# Patient Record
Sex: FEMALE | Race: WHITE | NOT HISPANIC OR LATINO | Employment: UNEMPLOYED | URBAN - METROPOLITAN AREA
[De-identification: names, ages, dates, MRNs, and addresses within clinical notes are randomized per-mention and may not be internally consistent; named-entity substitution may affect disease eponyms.]

---

## 2017-01-23 ENCOUNTER — OFFICE VISIT (OUTPATIENT)
Dept: BARIATRICS/WEIGHT MGMT | Facility: CLINIC | Age: 32
End: 2017-01-23

## 2017-01-23 VITALS
SYSTOLIC BLOOD PRESSURE: 118 MMHG | RESPIRATION RATE: 18 BRPM | BODY MASS INDEX: 40.98 KG/M2 | HEART RATE: 64 BPM | TEMPERATURE: 99.2 F | OXYGEN SATURATION: 99 % | HEIGHT: 66 IN | DIASTOLIC BLOOD PRESSURE: 70 MMHG | WEIGHT: 255 LBS

## 2017-01-23 VITALS
RESPIRATION RATE: 18 BRPM | HEART RATE: 78 BPM | DIASTOLIC BLOOD PRESSURE: 72 MMHG | HEIGHT: 66 IN | WEIGHT: 275.5 LBS | BODY MASS INDEX: 44.27 KG/M2 | TEMPERATURE: 96.5 F | SYSTOLIC BLOOD PRESSURE: 104 MMHG

## 2017-01-23 DIAGNOSIS — K21.9 GASTROESOPHAGEAL REFLUX DISEASE, ESOPHAGITIS PRESENCE NOT SPECIFIED: Primary | ICD-10-CM

## 2017-01-23 DIAGNOSIS — G43.A0 CYCLICAL VOMITING WITH NAUSEA, INTRACTABILITY OF VOMITING NOT SPECIFIED: ICD-10-CM

## 2017-01-23 DIAGNOSIS — R13.14 PHARYNGOESOPHAGEAL DYSPHAGIA: ICD-10-CM

## 2017-01-23 DIAGNOSIS — R19.7 DIARRHEA, UNSPECIFIED TYPE: ICD-10-CM

## 2017-01-23 DIAGNOSIS — D50.8 OTHER IRON DEFICIENCY ANEMIA: ICD-10-CM

## 2017-01-23 DIAGNOSIS — Z98.84 STATUS POST BARIATRIC SURGERY: ICD-10-CM

## 2017-01-23 PROBLEM — R11.15 CYCLICAL VOMITING WITH NAUSEA: Status: ACTIVE | Noted: 2017-01-23

## 2017-01-23 PROBLEM — D50.9 IRON DEFICIENCY ANEMIA: Status: ACTIVE | Noted: 2017-01-23

## 2017-01-23 PROCEDURE — 99214 OFFICE O/P EST MOD 30 MIN: CPT | Performed by: PHYSICIAN ASSISTANT

## 2017-01-23 RX ORDER — LISINOPRIL AND HYDROCHLOROTHIAZIDE 20; 12.5 MG/1; MG/1
1 TABLET ORAL DAILY
COMMUNITY
End: 2017-01-23

## 2017-01-23 RX ORDER — ONDANSETRON 4 MG/1
4 TABLET, FILM COATED ORAL EVERY 6 HOURS PRN
Qty: 21 TABLET | Refills: 0 | Status: SHIPPED | OUTPATIENT
Start: 2017-01-23

## 2017-01-23 RX ORDER — RANITIDINE 150 MG/1
150 TABLET ORAL 2 TIMES DAILY
COMMUNITY

## 2017-01-23 RX ORDER — BUSPIRONE HYDROCHLORIDE 10 MG/1
10 TABLET ORAL 3 TIMES DAILY
COMMUNITY
End: 2017-01-23

## 2017-01-23 RX ORDER — OMEPRAZOLE 40 MG/1
40 CAPSULE, DELAYED RELEASE ORAL DAILY
Qty: 30 CAPSULE | Refills: 2 | Status: SHIPPED | OUTPATIENT
Start: 2017-01-23 | End: 2017-02-22

## 2017-01-23 NOTE — MR AVS SNAPSHOT
Magalie N Shekhar   1/23/2017 10:00 AM   Office Visit    Dept Phone:  [phone]   Encounter #:  [id number]    Provider:  MICHAEL Rutledge   Department:  [facility] BARIATRIC SURGERY                Your Full Care Plan              Today's Medication Changes          These changes are accurate as of: 1/23/17 11:09 AM.  If you have any questions, ask your nurse or doctor.               New Medication(s)Ordered:     omeprazole 40 MG capsule   Commonly known as:  priLOSEC   Take 1 capsule by mouth Daily for 30 days.   Started by:  MICHAEL Rutledge       ondansetron 4 MG tablet   Commonly known as:  ZOFRAN   Take 1 tablet by mouth Every 6 (Six) Hours As Needed for nausea or vomiting.   Started by:  MICHAEL Rutledge         Stop taking medication(s)listed here:     busPIRone 10 MG tablet   Commonly known as:  BUSPAR   Stopped by:  MICHAEL Rutledge           lisinopril-hydrochlorothiazide 20-12.5 MG per tablet   Commonly known as:  PRINZIDE,ZESTORETIC   Stopped by:  MICHAEL Rutledge           metFORMIN 500 MG tablet   Commonly known as:  GLUCOPHAGE   Stopped by:  MICHAEL Rutledge                Where to Get Your Medications      These medications were sent to [place], [place] - [address] [facility] - [phone]  - [phone] [address] [address]     Phone:  [phone]     omeprazole 40 MG capsule    ondansetron 4 MG tablet                  Your Updated Medication List          This list is accurate as of: 1/23/17 11:09 AM.  Always use your most recent med list.                metoprolol tartrate 25 MG tablet   Commonly known as:  LOPRESSOR       omeprazole 40 MG capsule   Commonly known as:  priLOSEC   Take 1 capsule by mouth Daily for 30 days.       ondansetron 4 MG tablet   Commonly known as:  ZOFRAN   Take 1 tablet by mouth Every 6 (Six) Hours As Needed for nausea or vomiting.       raNITIdine 150 MG  tablet   Commonly known as:  ZANTAC               We Performed the Following     Amylase     CBC (No Diff)     Clostridium Difficile Toxin, PCR     Comprehensive Metabolic Panel     Fecal Leukocytes     Ferritin     Folate     Iron     Lipase     Methylmalonic Acid, Serum     Prealbumin     Stool Culture     Vitamin B1, Whole Blood     Vitamin D 25 Hydroxy       You Were Diagnosed With        Codes Comments    Gastroesophageal reflux disease, esophagitis presence not specified    -  Primary ICD-10-CM: K21.9  ICD-9-CM: 530.81     Pharyngoesophageal dysphagia     ICD-10-CM: R13.14  ICD-9-CM: 787.24     Cyclical vomiting with nausea, intractability of vomiting not specified     ICD-10-CM: G43.A0  ICD-9-CM: 536.2     Diarrhea, unspecified type     ICD-10-CM: R19.7  ICD-9-CM: 787.91     Other iron deficiency anemia     ICD-10-CM: D50.8       Instructions     None    Patient Instructions History      Upcoming Appointments     Visit Type Date Time Department    FOLLOW UP 2017 10:00 AM [facility]ATRIC [facility]      [employer] Signup     [facility] [employer]fing allows you to send messages to your doctor, view your test results, renew your prescriptions, schedule appointments, and more. To sign up, go to [employer] and click on the Sign Up Now link in the New User? box. Enter your [employer] Activation Code exactly as it appears below along with the last four digits of your Social Security Number and your Date of Birth () to complete the sign-up process. If you do not sign up before the expiration date, you must request a new code.    Kit[employer] Activation Code: [email or web address]  Expires: 2017 11:09 AM    If you have questions, you can email [email or web address] or call [phone] to talk to our Kitchensur[employer] staff. Remember, Kitchensurfing is NOT to be used for urgent needs. For medical emergencies, dial 911.               Other Info from Your Visit           Allergies     No Known Allergies      Vital Signs      "Blood Pressure Pulse Temperature Respirations Height    118/70 (BP Location: Left arm, Patient Position: Sitting, Cuff Size: Large Adult) 64 99.2 °F (37.3 °C) (Temporal Artery ) 18 66\" (167.6 cm)    Weight Oxygen Saturation Body Mass Index Smoking Status       255 lb (116 kg) 99% 41.16 kg/m2 Never Smoker       Problems and Diagnoses Noted     Anxiety problem    Cyclical vomiting with nausea    Diarrhea    Tiredness    Acid reflux disease    Personal history of Helicobacter infection    High blood pressure    Serum albumin decreased    Iron deficiency anemia    Difficulty swallowing        "

## 2017-01-23 NOTE — PROGRESS NOTES
[facility] BARIATRIC SURGERY  [address]  [facility] [address]  [phone]    Magalie SMITHA Corrigan.  1985    Date of Visit:   1/23/17  Reason for Visit:  Reflux/dysphagia  HPI:    31 y.o. year old female 20 months s/p LSG by Dr. Mora  on 5/4/15 @ [facility] followed by lap HHR on 12/2/15 by GDW for uncontrolled reflux. Issue improved but cont. needing daily Zantac. The past 3 months, she's not been able to eat meat d/t dysphagia/reflux, tried increasing Zantac to tid and problem continued. In the past 4 weeks she's had worsening dysphagia/reflux and now also having postprandial vomiting and epigastric pain. History of Hpylori: required 2 courses anbx prior to LSG, tx w/prevpak 4/2014,  then UBT(+) and tx Helidac 9/2014. LSG path negative 5/2015. and 7/17/15 HSA neg. EGD 9/10/15 Hpylori + tx w/Flagyl and Clarithromycin w/PPI, pt never had retest for H pylori to confirm clearance.  Note: the last OV in our clinic 2/2016 @ 9 months s/p LSG. Pt had 2 years annual in [place] 5/2016..   Past Medical History   Diagnosis Date   • Anxiety    • Chronic back pain      improved after WLS   • Dyspnea on exertion    • Fatigue    • GERD (gastroesophageal reflux disease)    • History of Helicobacter pylori infection      required 2 courses anbx prior to LSG, tx w/prevpak 4/2014,  then UBT(+) and tx Helidac 9/2014. LSG path negative 5/2015. and 7/17/15 HSA neg. EGD 9/10/15 Hpylori + tx w/Flagyl and Clarithromycin w/PPI, needs retesting   • Hypertension      off Lisinopril after WLS   • Hypoalbuminemia    • Leukocytosis    • Morbid obesity      Past Surgical History   Procedure Laterality Date   • [place] tooth extraction     • Cholecystectomy     • Colonoscopy         Current Outpatient Prescriptions:   •  raNITIdine (ZANTAC) 150 MG tablet, Take 300 mg by mouth 2 (Two) Times a Day., Disp: , Rfl:   •  metoprolol tartrate (LOPRESSOR) 25 MG tablet, Take 25 mg by mouth 2 (Two) Times a Day., Disp: ,  "Rfl:   •  omeprazole (priLOSEC) 40 MG capsule, Take 1 capsule by mouth Daily for 30 days., Disp: 30 capsule, Rfl: 2  •  ondansetron (ZOFRAN) 4 MG tablet, Take 1 tablet by mouth Every 6 (Six) Hours As Needed for nausea or vomiting., Disp: 21 tablet, Rfl: 0  No Known Allergies  Social History     Social History   • Marital status:      Spouse name: N/A   • Number of children: N/A   • Years of education: N/A     Occupational History   • Not on file.     Social History Main Topics   • Smoking status: Never Smoker   • Smokeless tobacco: Not on file   • Alcohol use No   • Drug use: No   • Sexual activity: Not on file      Comment:      Other Topics Concern   • Not on file     Social History Narrative    Lives in [place] [facility] w/4 children.     Working part-time [employer]     Review of Systems   General ROS: positive for - fatigue  Ophthalmic ROS: Negative for - vision changes  ENT ROS: Negative for - swallowing difficulty  Gastrointestinal ROS: Negative for - abdominal pain, constipation,   Positive diarrhea(1 week, 3x/day-now improving)  Neurological ROS: Negative for - numbness/tingling of extremities, memory loss  Positive for dizziness  Dermatological ROS: Negative for - Hair loss         Visit Vitals   • /70 (BP Location: Left arm, Patient Position: Sitting, Cuff Size: Large Adult)   • Pulse 64   • Temp 99.2 °F (37.3 °C) (Temporal Artery )   • Resp 18   • Ht 66\" (167.6 cm)   • Wt 255 lb (116 kg)  Comment: pre-op weight 462.2 lbs   • SpO2 99%   • BMI 41.16 kg/m2     Physical Exam:  Constitutional: She is oriented to person, place, and time. She appears well-developed and well-nourished.   Oral Cavity:  Buccal Mucosa: The buccal mucosa was moist.  Neck: supple.   Cardiovascular: Normal rate and regular rhythm.   No murmur heard.  Pulmonary/Chest: Breath sounds normal. No respiratory distress. She has no wheezes. She has no rales.   Abdominal: No mass was palpated in the abdomen, Soft, " nontender, and nondistended. Bowel sounds are normal.   Skin:  The complexion was normal.  The skin moisture was normal and the skin temperature was normal.   Neurological:  Was alert and oriented.   Gait And Stance: Gait and stance were normal.  Psychiatric:   Attitude: The attitude was cooperative.   Mood: Mood pleasant.       Assessment:     ICD-10-CM ICD-9-CM   1. Gastroesophageal reflux disease, esophagitis presence not specified K21.9 530.81   2. Pharyngoesophageal dysphagia R13.14 787.24   3. Cyclical vomiting with nausea, intractability of vomiting not specified G43.A0 536.2   4. Diarrhea, unspecified type R19.7 787.91   5. Other iron deficiency anemia D50.8      Plan:     Orders Placed This Encounter   Procedures   • Clostridium Difficile Toxin, PCR   • Fecal Leukocytes   • Stool Culture   • Comprehensive Metabolic Panel   • CBC (No Diff)   • Ferritin   • Folate   • Iron   • Methylmalonic Acid, Serum   • Prealbumin   • Vitamin B1, Whole Blood   • Vitamin D 25 Hydroxy   • Amylase   • Lipase     Requested Prescriptions     Signed Prescriptions Disp Refills   • omeprazole (priLOSEC) 40 MG capsule 30 capsule 2     Sig: Take 1 capsule by mouth Daily for 30 days.   • ondansetron (ZOFRAN) 4 MG tablet 21 tablet 0     Sig: Take 1 tablet by mouth Every 6 (Six) Hours As Needed for nausea or vomiting.     Order UGI   Further input pending results.    MICHAEL Rutledge

## 2017-01-25 LAB
25(OH)D3+25(OH)D2 SERPL-MCNC: 18.4 NG/ML
ALBUMIN SERPL-MCNC: 3.8 G/DL (ref 3.2–4.8)
ALBUMIN/GLOB SERPL: 1.7 G/DL (ref 1.5–2.5)
ALP SERPL-CCNC: 56 U/L (ref 25–100)
ALT SERPL-CCNC: 28 U/L (ref 7–40)
AST SERPL-CCNC: 28 U/L (ref 0–33)
BILIRUB SERPL-MCNC: 0.7 MG/DL (ref 0.3–1.2)
BUN SERPL-MCNC: 14 MG/DL (ref 9–23)
BUN/CREAT SERPL: 28 (ref 7–25)
CALCIUM SERPL-MCNC: 9.1 MG/DL (ref 8.7–10.4)
CHLORIDE SERPL-SCNC: 108 MMOL/L (ref 99–109)
CO2 SERPL-SCNC: 31 MMOL/L (ref 20–31)
CREAT SERPL-MCNC: 0.5 MG/DL (ref 0.6–1.3)
ERYTHROCYTE [DISTWIDTH] IN BLOOD BY AUTOMATED COUNT: 14.9 % (ref 11.3–14.5)
FERRITIN SERPL-MCNC: 13 NG/ML (ref 10–291)
FOLATE SERPL-MCNC: 4.89 NG/ML (ref 3.2–20)
GLOBULIN SER CALC-MCNC: 2.3 GM/DL
GLUCOSE SERPL-MCNC: 84 MG/DL (ref 70–100)
HCT VFR BLD AUTO: 36.2 % (ref 34.5–44)
HGB BLD-MCNC: 11.4 G/DL (ref 11.5–15.5)
IRON SERPL-MCNC: 43 MCG/DL (ref 50–175)
MCH RBC QN AUTO: 28.9 PG (ref 27–31)
MCHC RBC AUTO-ENTMCNC: 31.5 G/DL (ref 32–36)
MCV RBC AUTO: 91.9 FL (ref 80–99)
METHYLMALONATE SERPL-SCNC: 162 NMOL/L (ref 0–378)
PLATELET # BLD AUTO: 223 10*3/MM3 (ref 150–450)
POTASSIUM SERPL-SCNC: 4.7 MMOL/L (ref 3.5–5.5)
PREALB SERPL-MCNC: 20 MG/DL (ref 14–35)
PROT SERPL-MCNC: 6.1 G/DL (ref 5.7–8.2)
RBC # BLD AUTO: 3.94 10*6/MM3 (ref 3.89–5.14)
SODIUM SERPL-SCNC: 142 MMOL/L (ref 132–146)
VIT B1 BLD-SCNC: 140.9 NMOL/L (ref 66.5–200)
WBC # BLD AUTO: 5.42 10*3/MM3 (ref 3.5–10.8)

## 2017-01-27 PROBLEM — Z98.84 STATUS POST BARIATRIC SURGERY: Status: ACTIVE | Noted: 2017-01-27

## 2017-02-01 ENCOUNTER — TELEPHONE (OUTPATIENT)
Dept: BARIATRICS/WEIGHT MGMT | Facility: CLINIC | Age: 32
End: 2017-02-01

## 2017-02-01 NOTE — TELEPHONE ENCOUNTER
I contacted the patient and she states that she has the lab order for this and will take it to a local lab and have this completed. I informed patient to let us know where and when she has this done so we can get the results.

## 2017-02-01 NOTE — TELEPHONE ENCOUNTER
----- Message from MICHAEL Rutledge sent at 1/27/2017 10:13 PM EST -----  Please ask pt to return for Hpylori stool antigen.  thx

## 2017-02-06 ENCOUNTER — HOSPITAL ENCOUNTER (OUTPATIENT)
Dept: GENERAL RADIOLOGY | Facility: HOSPITAL | Age: 32
Discharge: HOME | End: 2017-02-06
Admitting: PHYSICIAN ASSISTANT

## 2017-02-06 PROCEDURE — 74241: CPT

## 2017-02-06 RX ORDER — ERGOCALCIFEROL 1.25 MG/1
50000 CAPSULE ORAL WEEKLY
Qty: 12 CAPSULE | Refills: 0 | Status: SHIPPED | OUTPATIENT
Start: 2017-02-06

## 2017-02-17 ENCOUNTER — TELEPHONE (OUTPATIENT)
Dept: BARIATRICS/WEIGHT MGMT | Facility: CLINIC | Age: 32
End: 2017-02-17

## 2017-02-17 NOTE — TELEPHONE ENCOUNTER
Notified pt of her results of her UGI.  Pt said that she has not had the HPylori test done yet.  I notified pt that she needs to have this done when she can and please send us the results or notify us when she does so we can get the results.  Pt verbalized understanding.

## 2017-02-24 LAB
C DIFF TOX A+B STL QL IA: NEGATIVE
Lab: NORMAL

## 2017-02-24 NOTE — TELEPHONE ENCOUNTER
Pt had test done at [facility] in [facility]. Rocio is helping me get results.  Thanks

## 2017-02-26 LAB
BACTERIA SPEC CULT: NORMAL
BACTERIA SPEC CULT: NORMAL
CAMPYLOBACTER STL CULT: NORMAL
E COLI SXT STL QL IA: NEGATIVE
Lab: NORMAL
SALM + SHIG STL CULT: NORMAL
WBC STL QL MICRO: NORMAL
WBC STL QL MICRO: NORMAL

## 2017-02-28 ENCOUNTER — TELEPHONE (OUTPATIENT)
Dept: BARIATRICS/WEIGHT MGMT | Facility: CLINIC | Age: 32
End: 2017-02-28

## 2017-02-28 NOTE — TELEPHONE ENCOUNTER
I have tried contacting this pt with no answer.  I have talked to this pt several times with a run around of her HPylori test being with [facility] and we contacted them and it was not done there.  Then the pt said [facility] and we called there and it was not there either.  Please advise me on what to do.  Thanks!

## 2017-12-18 ENCOUNTER — OFFICE VISIT (OUTPATIENT)
Dept: BARIATRICS/WEIGHT MGMT | Facility: CLINIC | Age: 32
End: 2017-12-18

## 2017-12-18 VITALS
OXYGEN SATURATION: 99 % | WEIGHT: 290 LBS | HEART RATE: 82 BPM | RESPIRATION RATE: 18 BRPM | SYSTOLIC BLOOD PRESSURE: 110 MMHG | DIASTOLIC BLOOD PRESSURE: 78 MMHG | HEIGHT: 66 IN | BODY MASS INDEX: 46.61 KG/M2 | TEMPERATURE: 97.8 F

## 2017-12-18 DIAGNOSIS — E55.9 VITAMIN D DEFICIENCY: ICD-10-CM

## 2017-12-18 DIAGNOSIS — E66.01 MORBID OBESITY (HCC): ICD-10-CM

## 2017-12-18 DIAGNOSIS — K21.9 GASTROESOPHAGEAL REFLUX DISEASE, ESOPHAGITIS PRESENCE NOT SPECIFIED: ICD-10-CM

## 2017-12-18 DIAGNOSIS — R10.13 DYSPEPSIA: Primary | ICD-10-CM

## 2017-12-18 DIAGNOSIS — Z98.84 S/P BARIATRIC SURGERY: ICD-10-CM

## 2017-12-18 DIAGNOSIS — E61.1 IRON DEFICIENCY: ICD-10-CM

## 2017-12-18 PROBLEM — R11.15 CYCLICAL VOMITING WITH NAUSEA: Status: RESOLVED | Noted: 2017-01-23 | Resolved: 2017-12-18

## 2017-12-18 PROBLEM — R13.14 PHARYNGOESOPHAGEAL DYSPHAGIA: Status: RESOLVED | Noted: 2017-01-23 | Resolved: 2017-12-18

## 2017-12-18 PROBLEM — R19.7 DIARRHEA: Status: RESOLVED | Noted: 2017-01-23 | Resolved: 2017-12-18

## 2017-12-18 PROCEDURE — 99214 OFFICE O/P EST MOD 30 MIN: CPT | Performed by: PHYSICIAN ASSISTANT

## 2017-12-18 RX ORDER — OMEPRAZOLE 40 MG/1
40 CAPSULE, DELAYED RELEASE ORAL 2 TIMES DAILY
COMMUNITY

## 2017-12-18 RX ORDER — GABAPENTIN 600 MG/1
TABLET ORAL
COMMUNITY
Start: 2017-12-04

## 2017-12-18 RX ORDER — BUPRENORPHINE HYDROCHLORIDE AND NALOXONE HYDROCHLORIDE DIHYDRATE 8; 2 MG/1; MG/1
TABLET SUBLINGUAL
COMMUNITY
Start: 2017-12-11

## 2017-12-18 RX ORDER — HYDROXYZINE HYDROCHLORIDE 10 MG/1
TABLET, FILM COATED ORAL
COMMUNITY
Start: 2017-12-08

## 2017-12-18 RX ORDER — METHOCARBAMOL 750 MG/1
TABLET, FILM COATED ORAL
COMMUNITY
Start: 2017-11-14

## 2017-12-18 RX ORDER — IBUPROFEN 800 MG/1
TABLET ORAL
COMMUNITY
Start: 2017-11-06

## 2017-12-18 NOTE — PROGRESS NOTES
"[facility] Bariatric Surgery  [address]  [facility] [address] [phone]        Patient Name:  Magalie Corrigan.  :  1985      Date of Visit: 2017      Reason for Visit:   reflux    HPI: Magalie Corrigan is a 32 y.o. female 2+ yrs s/p LS[facility] on [facility], followed by lap HHR 2015 by [facility]DW.    LOV 2017 - c/o uncontrolled reflux w/ dysphagia.  Eval included:  UGI 17 @[facility] revealing possible paraesophageal hernia vs hiatal hernia.  Patient has not followed up since that time.    Returns now w/ complaints of persistent/worsening symptoms.  Having uncontrolled acid reflux, worse in the PM, wakes choking on acid, despite taking BID PPI + BID H2 blocker.  Has associated nausea and takes Zofran prn which helps only \"sometimes\" - \"depends on how bad the acid reflux is\".  Also c/o postprandial epigastric pain that wraps around left side into back, worse w/ higher protein/solid foods.  As such making poor food choices and notes a 30-40 lbs weight gain in the last 1 year.      Hx o/w significant for remote cherelle .  Issues w/ drug abuse/addiction s/p LSG.  Now on Suboxone and sober x 8 months.  Takes Ibuprofen 800mg prn pain.  Has had recent steroids.  Denies tobacco.   Hx persistent H.Pylori - awaiting f/up stool study results.  As above, s/p lap R 2015.    Taking a MVI, B12, and iron daily, along w/ Vit D wkly.  Last labs 2017 revealed iron and Vit D deficiencies.     Presurgery weight: 426 pounds.  Today's weight is 132 kg (290 lb) pounds, today's  Body mass index is 46.81 kg/(m^2)., and her weight loss since surgery is 136 pounds.      Past Medical History:   Diagnosis Date   • Anxiety    • Chronic back pain    • Drug abuse     on Suboxone   • Dyspnea on exertion    • Fatigue    • GERD (gastroesophageal reflux disease)    • History of Helicobacter pylori infection     required 2 courses abx prior to LSG - tx w/prevpak 2014,  then UBT(+) and tx " Helidac 9/2014. LSG path negative 5/2015. and 7/17/15 HSA neg. EGD 9/10/15 Hpylori + tx w/Flagyl and Clarithromycin w/PPI, needs retesting   • Hypertension     off meds s/p WLS   • Hypoalbuminemia    • Leukocytosis    • Morbid obesity      Past Surgical History:   Procedure Laterality Date   • CHOLECYSTECTOMY  2014    for stones   • GASTRIC SLEEVE LAPAROSCOPIC  05/2015    s/p LSG by KARENA @ [facility]   • HIATAL HERNIA REPAIR  12/2015    s/p lap HHR by GDW   • WISDOM TOOTH EXTRACTION       Outpatient Prescriptions Marked as Taking for the 12/18/17 encounter (Office Visit) with MICHAEL Perez   Medication Sig Dispense Refill   • buprenorphine-naloxone (SUBOXONE) 8-2 MG per SL tablet      • gabapentin (NEURONTIN) 600 MG tablet      • hydrOXYzine (ATARAX) 10 MG tablet      • ibuprofen (ADVIL,MOTRIN) 800 MG tablet      • methocarbamol (ROBAXIN) 750 MG tablet      • omeprazole (priLOSEC) 40 MG capsule Take 40 mg by mouth 2 (Two) Times a Day.     • ondansetron (ZOFRAN) 4 MG tablet Take 1 tablet by mouth Every 6 (Six) Hours As Needed for nausea or vomiting. 21 tablet 0   • raNITIdine (ZANTAC) 150 MG tablet Take 150 mg by mouth 2 (Two) Times a Day.     • vitamin D (ERGOCALCIFEROL) 89370 UNITS capsule capsule Take 1 capsule by mouth 1 (One) Time Per Week. 12 capsule 0   • [DISCONTINUED] metoprolol tartrate (LOPRESSOR) 25 MG tablet Take 25 mg by mouth 2 (Two) Times a Day.         No Known Allergies    Social History     Social History   • Marital status:      Spouse name: N/A   • Number of children: N/A   • Years of education: N/A     Occupational History   • Not on file.     Social History Main Topics   • Smoking status: Never Smoker   • Smokeless tobacco: Not on file   • Alcohol use No   • Drug use: No   • Sexual activity: Not on file      Comment:      Other Topics Concern   • Not on file     Social History Narrative    Lives in [facility] w/4 children.     Working part-time [employer]       /78  "(BP Location: Left arm, Patient Position: Sitting, Cuff Size: Large Adult)  Pulse 82  Temp 97.8 °F (36.6 °C) (Temporal Artery )   Resp 18  Ht 167.6 cm (66\")  Wt 132 kg (290 lb)  SpO2 99%  BMI 46.81 kg/m2    Physical Exam   Constitutional: She appears well-developed and well-nourished. She is cooperative.   HENT:   Mouth/Throat: Oropharynx is clear and moist and mucous membranes are normal.   Eyes: Conjunctivae are normal. No scleral icterus.   Cardiovascular: Normal rate.    Pulmonary/Chest: Effort normal.   Abdominal: Soft. There is no tenderness.   Musculoskeletal: Normal range of motion. She exhibits no edema.   Neurological: She is alert.   Skin: Skin is warm and dry. No rash noted.   Psychiatric: She has a normal mood and affect. Judgment normal.         Assessment:  2+ yrs s/p LSG by KARENA on 5/4/2015 @[facility]    ICD-10-CM ICD-9-CM   1. Dyspepsia R10.13 536.8   2. Gastroesophageal reflux disease, esophagitis presence not specified K21.9 530.81   3. Vitamin D deficiency E55.9 268.9   4. Iron deficiency E61.1 280.9   5. Morbid obesity E66.01 278.01   6. S/P bariatric surgery Z98.84 V45.86       Plan:  Full bariatric panel ordered.  Will f/up on H.Pylori testing as well.  Will then discuss further w/ Dr. Mora - may need repeat UGI vs EGD at this time.  Continue current antacid regimen.  Further input to follow.              "

## 2017-12-19 DIAGNOSIS — R10.13 DYSPEPSIA: Primary | ICD-10-CM

## 2017-12-23 LAB
25(OH)D3+25(OH)D2 SERPL-MCNC: 31.4 NG/ML (ref 30–100)
A-TOCOPHEROL VIT E SERPL-MCNC: 9.5 MG/L (ref 5.3–16.8)
ALBUMIN SERPL-MCNC: 3.7 G/DL (ref 3.5–5.5)
ALBUMIN/GLOB SERPL: 1.5 {RATIO} (ref 1.2–2.2)
ALP SERPL-CCNC: 69 IU/L (ref 39–117)
ALT SERPL-CCNC: 15 IU/L (ref 0–32)
AST SERPL-CCNC: 25 IU/L (ref 0–40)
BASOPHILS # BLD AUTO: 0 X10E3/UL (ref 0–0.2)
BASOPHILS NFR BLD AUTO: 1 %
BILIRUB SERPL-MCNC: 0.3 MG/DL (ref 0–1.2)
BUN SERPL-MCNC: 9 MG/DL (ref 6–20)
BUN/CREAT SERPL: 13 (ref 9–23)
CALCIUM SERPL-MCNC: 8.7 MG/DL (ref 8.7–10.2)
CHLORIDE SERPL-SCNC: 103 MMOL/L (ref 96–106)
CO2 SERPL-SCNC: 25 MMOL/L (ref 18–29)
CREAT SERPL-MCNC: 0.71 MG/DL (ref 0.57–1)
EOSINOPHIL # BLD AUTO: 0.1 X10E3/UL (ref 0–0.4)
EOSINOPHIL NFR BLD AUTO: 2 %
ERYTHROCYTE [DISTWIDTH] IN BLOOD BY AUTOMATED COUNT: 15.7 % (ref 12.3–15.4)
FERRITIN SERPL-MCNC: 10 NG/ML (ref 15–150)
FOLATE SERPL-MCNC: 6.2 NG/ML
GLOBULIN SER CALC-MCNC: 2.5 G/DL (ref 1.5–4.5)
GLUCOSE SERPL-MCNC: 84 MG/DL (ref 65–99)
HCT VFR BLD AUTO: 35 % (ref 34–46.6)
HGB BLD-MCNC: 10.8 G/DL (ref 11.1–15.9)
IMM GRANULOCYTES # BLD: 0 X10E3/UL (ref 0–0.1)
IMM GRANULOCYTES NFR BLD: 0 %
IRON SERPL-MCNC: 57 UG/DL (ref 27–159)
LYMPHOCYTES # BLD AUTO: 2.1 X10E3/UL (ref 0.7–3.1)
LYMPHOCYTES NFR BLD AUTO: 35 %
MAGNESIUM SERPL-MCNC: 1.9 MG/DL (ref 1.6–2.3)
MCH RBC QN AUTO: 26.3 PG (ref 26.6–33)
MCHC RBC AUTO-ENTMCNC: 30.9 G/DL (ref 31.5–35.7)
MCV RBC AUTO: 85 FL (ref 79–97)
METHYLMALONATE SERPL-SCNC: 106 NMOL/L (ref 0–378)
MONOCYTES # BLD AUTO: 0.4 X10E3/UL (ref 0.1–0.9)
MONOCYTES NFR BLD AUTO: 7 %
NEUTROPHILS # BLD AUTO: 3.2 X10E3/UL (ref 1.4–7)
NEUTROPHILS NFR BLD AUTO: 55 %
PHOSPHATE SERPL-MCNC: 3.7 MG/DL (ref 2.5–4.5)
PLATELET # BLD AUTO: 316 X10E3/UL (ref 150–379)
POTASSIUM SERPL-SCNC: 4.8 MMOL/L (ref 3.5–5.2)
PREALB SERPL-MCNC: 19 MG/DL (ref 14–35)
PROT SERPL-MCNC: 6.2 G/DL (ref 6–8.5)
PTH-INTACT SERPL-MCNC: 26 PG/ML (ref 15–65)
RBC # BLD AUTO: 4.11 X10E6/UL (ref 3.77–5.28)
SODIUM SERPL-SCNC: 141 MMOL/L (ref 134–144)
VIT A SERPL-MCNC: 42 UG/DL (ref 20–65)
VIT B1 BLD-SCNC: 137.7 NMOL/L (ref 66.5–200)
WBC # BLD AUTO: 5.9 X10E3/UL (ref 3.4–10.8)
ZINC SERPL-MCNC: 63 UG/DL (ref 56–134)

## 2018-01-03 ENCOUNTER — OFFICE VISIT (OUTPATIENT)
Dept: NEUROSURGERY | Facility: CLINIC | Age: 33
End: 2018-01-03

## 2018-01-03 VITALS — HEIGHT: 66 IN | BODY MASS INDEX: 46.12 KG/M2 | TEMPERATURE: 96.2 F | WEIGHT: 287 LBS

## 2018-01-03 DIAGNOSIS — M50.30 DDD (DEGENERATIVE DISC DISEASE), CERVICAL: ICD-10-CM

## 2018-01-03 DIAGNOSIS — M47.812 SPONDYLOSIS OF CERVICAL REGION WITHOUT MYELOPATHY OR RADICULOPATHY: ICD-10-CM

## 2018-01-03 DIAGNOSIS — S13.9XXA NECK SPRAIN, INITIAL ENCOUNTER: Primary | ICD-10-CM

## 2018-01-03 PROCEDURE — 99243 OFF/OP CNSLTJ NEW/EST LOW 30: CPT | Performed by: NEUROLOGICAL SURGERY
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
## 2018-03-19 ENCOUNTER — RESULTS ENCOUNTER (OUTPATIENT)
Dept: BARIATRICS/WEIGHT MGMT | Facility: CLINIC | Age: 33
End: 2018-03-19

## 2018-03-19 DIAGNOSIS — R10.13 DYSPEPSIA: ICD-10-CM

## 2022-01-01 ENCOUNTER — HOSPITAL ENCOUNTER (EMERGENCY)
Facility: HOSPITAL | Age: 37
End: 2022-11-09
Attending: EMERGENCY MEDICINE | Admitting: EMERGENCY MEDICINE

## 2022-01-01 DIAGNOSIS — I46.9 CARDIAC ARREST: Primary | ICD-10-CM

## 2022-01-01 PROCEDURE — 25010000002 TENECTEPLASE PER 50 MG: Performed by: EMERGENCY MEDICINE

## 2022-01-01 PROCEDURE — C1751 CATH, INF, PER/CENT/MIDLINE: HCPCS

## 2022-01-01 PROCEDURE — 92950 HEART/LUNG RESUSCITATION CPR: CPT

## 2022-01-01 PROCEDURE — 99284 EMERGENCY DEPT VISIT MOD MDM: CPT

## 2022-01-01 PROCEDURE — 25010000002 EPINEPHRINE 1 MG/10ML SOLUTION PREFILLED SYRINGE: Performed by: EMERGENCY MEDICINE

## 2022-01-01 PROCEDURE — 94799 UNLISTED PULMONARY SVC/PX: CPT

## 2022-01-01 RX ORDER — EPINEPHRINE 0.1 MG/ML
SYRINGE (ML) INJECTION
Status: DISCONTINUED | OUTPATIENT
Start: 2022-01-01 | End: 2022-01-01 | Stop reason: HOSPADM

## 2022-01-01 RX ADMIN — SODIUM BICARBONATE 50 MEQ: 84 INJECTION, SOLUTION INTRAVENOUS at 17:25

## 2022-01-01 RX ADMIN — EPINEPHRINE 1 MG: 0.1 INJECTION, SOLUTION ENDOTRACHEAL; INTRACARDIAC; INTRAVENOUS at 17:21

## 2022-01-01 RX ADMIN — EPINEPHRINE 1 MG: 0.1 INJECTION, SOLUTION ENDOTRACHEAL; INTRACARDIAC; INTRAVENOUS at 17:24

## 2022-01-01 RX ADMIN — SODIUM BICARBONATE 50 MEQ: 84 INJECTION, SOLUTION INTRAVENOUS at 17:37

## 2022-01-01 RX ADMIN — TENECTEPLASE 25 MG: KIT at 17:27

## 2022-01-01 RX ADMIN — EPINEPHRINE 1 MG: 0.1 INJECTION, SOLUTION ENDOTRACHEAL; INTRACARDIAC; INTRAVENOUS at 17:42

## 2022-01-01 RX ADMIN — EPINEPHRINE 1 MG: 0.1 INJECTION, SOLUTION ENDOTRACHEAL; INTRACARDIAC; INTRAVENOUS at 17:46

## 2022-01-01 RX ADMIN — EPINEPHRINE 1 MG: 0.1 INJECTION, SOLUTION ENDOTRACHEAL; INTRACARDIAC; INTRAVENOUS at 17:38

## 2022-01-01 RX ADMIN — EPINEPHRINE 1 MG: 0.1 INJECTION, SOLUTION ENDOTRACHEAL; INTRACARDIAC; INTRAVENOUS at 17:28

## 2022-01-01 RX ADMIN — EPINEPHRINE 1 MG: 0.1 INJECTION, SOLUTION ENDOTRACHEAL; INTRACARDIAC; INTRAVENOUS at 17:33

## 2022-11-09 NOTE — ED PROVIDER NOTES
Subjective   History of Present Illness  37-year-old female presents to the ED via EMS for chief complaint of cardiac arrest.  Per EMS they were called secondary to the patient having worsening shortness of breath.  The patient initially told that she had been short of breath for 3 days.  On EMSs initial arrival the patient's pulse ox was 90.  Apparently she looked at EMS and said that she could not breathe and she was going to die.  The patient then had cardiac arrest.  She stopped breathing and went into pulseless electrical activity.  The patient was subsequently intubated and CPR was initiated per EMS.  They continued CPR per ACLS protocol in route to the hospital.  On arrival to the ED patient was still in asystole with CPR in progress.        Review of Systems   Unable to perform ROS: Intubated (CPR in progress)       Past Medical History:   Diagnosis Date   • Anxiety    • Chronic back pain    • Drug abuse     on Suboxone   • Dyspnea on exertion    • Fatigue    • GERD (gastroesophageal reflux disease)    • History of Helicobacter pylori infection     required 2 courses abx prior to LSG - tx w/prevpak 4/2014,  then UBT(+) and tx Helidac 9/2014. LSG path negative 5/2015. and 7/17/15 HSA neg. EGD 9/10/15 Hpylori + tx w/Flagyl and Clarithromycin w/PPI, needs retesting   • Hypertension     off meds s/p WLS   • Hypoalbuminemia    • Leukocytosis    • Morbid obesity (CMS/HCC)        No Known Allergies    Past Surgical History:   Procedure Laterality Date   • CHOLECYSTECTOMY  2014    for stones   • GASTRIC SLEEVE LAPAROSCOPIC  05/2015    s/p LSG by KARENA @ [facility]   • HIATAL HERNIA REPAIR  12/2015    s/p lap HHR by KARENA   • WISDOM TOOTH EXTRACTION         Family History   Problem Relation Age of Onset   • Obesity Father    • Sleep apnea Father    • Heart attack Father    • Hypertension Father    • Obesity Sister    • Stroke Maternal Grandmother    • Cancer Paternal Grandmother        Social History     Socioeconomic History    • Marital status:    Tobacco Use   • Smoking status: Never   • Smokeless tobacco: Never   Substance and Sexual Activity   • Alcohol use: No   • Drug use: No           Objective   Physical Exam  Vitals and nursing note reviewed.   Constitutional:       General: She is in acute distress.      Appearance: She is obese. She is not diaphoretic.      Comments: CPR in progress.  Mottled.  Morbidly obese.  Unresponsive.  Intubated   HENT:      Head: Normocephalic and atraumatic.   Eyes:      Comments: Pupils fixed and dilated bilaterally.  Pupils nonreactive.  No conjunctival injection   Cardiovascular:      Comments: No palpable carotid or femoral pulses.  No spontaneous heart sounds.  Pulmonary:      Comments: Coarse breath sounds with bag ventilation via ET tube  Abdominal:      Palpations: Abdomen is soft.   Skin:     Comments: Mottled     Neurological:      Comments: Unresponsive.  GCS 3 T         IO Line Insertion    Date/Time: 11/9/2022 6:19 PM  Performed by: Lev Krueger DO  Authorized by: Lev Krueger DO     Consent:     Consent obtained:  Emergent situation  Pre-procedure details:     Site preparation:  Alcohol  Anesthesia:     Anesthesia method:  None  Procedure details:     Insertion site:  L proximal tibia    Insertion device:  18 gauge IO needle and drill device    Insertion: Needle was inserted through the bony cortex      Number of attempts:  1    Insertion confirmation:  Aspiration of blood/marrow, easy infusion of fluids and stability of the needle  Post-procedure details:     Secured with:  Tape    Patient tolerance of procedure: Patient unresponsive.               ED Course           EMS pre-arrival directions were given.                                MDM      Critical Care  Performed by: Lev Krueger DO  Authorized by: Lev Krueger DO     Critical care provider statement:     Critical care time (minutes): 35    Critical care time was exclusive of:  Separately billable  procedures and treating other patients    Critical care was necessary to treat or prevent imminent or life-threatening deterioration of the following conditions: Cardiac arrest    Critical care was time spent personally by me on the following activities:  Ordering and performing treatments and interventions, development of treatment plan with patient or surrogate, discussions with consultants, evaluation of patient's response to treatment, examination of patient, ordering and review of laboratory studies, ordering and review of radiographic studies, pulse oximetry, re-evaluation of patient's condition and review of old charts    CPR Procedure    Upon my arrival: Asystole and unresponsive  Rhythm on arrival: Asystole  Interventions: CPR, central line placement, IO placement, attempted cardiac ultrasound, use of Doppler to evaluate pulses  Outcome: Asystole, patient pronounced  at 1749      Central Line At Bedside  Performed by: Lev Krueger DO  Authorized by: Lev Krueger DO     Consent:     Consent obtained: Emergent situation, code line  Pre-procedure details:     Hand hygiene: Hand hygiene performed prior to insertion      Sterile barrier technique: Code line    Anesthesia method:  Local infiltration    Local anesthetic:  Lidocaine 1% w/o epi  Procedure details:     Location:  R femoral    Patient position:  Flat    Procedural supplies:  Triple lumen    Catheter size:  7.5 Fr    Landmarks identified: yes      Ultrasound guidance: yes      Sterile ultrasound techniques: Ultrasound used    Number of attempts:  1    Successful placement: yes    Post-procedure details:     Post-procedure:  Dressing applied    Assessment:  Blood return through all ports    Patient tolerance of procedure:  Tolerated well, no immediate complications      37-year-old female presented to the ED from home with CPR in progress.  Patient has had approximately 30 minutes of CPR via EMS prior to arrival in the ED.  The patient  is morbidly obese and body habitus presented significant complications and challenges to resuscitation.  I was able to place a left pretibial IO which we used for medications until I was able to place a right femoral CVC.  We continued CPR here in the ED for another approximately 35 to 40 minutes.  Patient continued to be asystole throughout her time in the ED.  She did have a few episodes of PEA.  Approximately long-term through CPR I felt that the patient may benefit from TN K.  TNKase was given and a bolus and CPR was continued for approximately another 20 minutes.  Patient had no cardiac activity.  Attempted cardiac ultrasound but was unable to obtain an appropriate view secondary to patient's body habitus.  Did use the ultrasound to locate femoral artery and multiple times when CPR was stopped we used the Doppler to assess arterial activity.  She had no activity noted multiple times.  After prolonged CPR without any return of spontaneous circulation patient was pronounced  at 1749.          Final diagnoses:   Cardiac arrest (HCC)       ED Disposition  ED Disposition     ED Disposition       Condition   --    Comment   --             No follow-up provider specified.       Medication List      No changes were made to your prescriptions during this visit.          Lev Krueger, DO  [phone]

## 2022-11-09 NOTE — CODE DOCUMENTATION
Per EMS, patient called EMS complaining of shortness of breath x3 weeks.  She went into cardiac arrest while EMS was there